# Patient Record
Sex: FEMALE | Race: WHITE | URBAN - METROPOLITAN AREA
[De-identification: names, ages, dates, MRNs, and addresses within clinical notes are randomized per-mention and may not be internally consistent; named-entity substitution may affect disease eponyms.]

---

## 2017-08-02 ENCOUNTER — APPOINTMENT (OUTPATIENT)
Dept: MRI IMAGING | Facility: CLINIC | Age: 77
End: 2017-08-02
Attending: EMERGENCY MEDICINE
Payer: MEDICARE

## 2017-08-02 ENCOUNTER — HOSPITAL ENCOUNTER (OUTPATIENT)
Facility: CLINIC | Age: 77
Setting detail: OBSERVATION
Discharge: HOME | End: 2017-08-03
Attending: EMERGENCY MEDICINE | Admitting: HOSPITALIST
Payer: MEDICARE

## 2017-08-02 ENCOUNTER — APPOINTMENT (OUTPATIENT)
Dept: PHYSICAL THERAPY | Facility: CLINIC | Age: 77
End: 2017-08-02
Attending: PHYSICIAN ASSISTANT
Payer: MEDICARE

## 2017-08-02 DIAGNOSIS — R42 VERTIGO: ICD-10-CM

## 2017-08-02 DIAGNOSIS — H81.12 BPPV (BENIGN PAROXYSMAL POSITIONAL VERTIGO), LEFT: Primary | ICD-10-CM

## 2017-08-02 PROBLEM — H81.10 BPPV (BENIGN PAROXYSMAL POSITIONAL VERTIGO): Status: ACTIVE | Noted: 2017-08-02

## 2017-08-02 LAB
ALBUMIN UR-MCNC: NEGATIVE MG/DL
ANION GAP SERPL CALCULATED.3IONS-SCNC: 8 MMOL/L (ref 3–14)
APPEARANCE UR: CLEAR
BACTERIA #/AREA URNS HPF: ABNORMAL /HPF
BASOPHILS # BLD AUTO: 0 10E9/L (ref 0–0.2)
BASOPHILS NFR BLD AUTO: 0.5 %
BILIRUB UR QL STRIP: NEGATIVE
BUN SERPL-MCNC: 19 MG/DL (ref 7–30)
CALCIUM SERPL-MCNC: 8.6 MG/DL (ref 8.5–10.1)
CHLORIDE SERPL-SCNC: 104 MMOL/L (ref 94–109)
CO2 SERPL-SCNC: 26 MMOL/L (ref 20–32)
COLOR UR AUTO: ABNORMAL
CREAT SERPL-MCNC: 0.9 MG/DL (ref 0.52–1.04)
DIFFERENTIAL METHOD BLD: NORMAL
EOSINOPHIL # BLD AUTO: 0.3 10E9/L (ref 0–0.7)
EOSINOPHIL NFR BLD AUTO: 3.4 %
ERYTHROCYTE [DISTWIDTH] IN BLOOD BY AUTOMATED COUNT: 12.8 % (ref 10–15)
GFR SERPL CREATININE-BSD FRML MDRD: 61 ML/MIN/1.7M2
GLUCOSE BLDC GLUCOMTR-MCNC: 266 MG/DL (ref 70–99)
GLUCOSE BLDC GLUCOMTR-MCNC: 323 MG/DL (ref 70–99)
GLUCOSE SERPL-MCNC: 384 MG/DL (ref 70–99)
GLUCOSE UR STRIP-MCNC: >499 MG/DL
HCT VFR BLD AUTO: 35.6 % (ref 35–47)
HGB BLD-MCNC: 11.7 G/DL (ref 11.7–15.7)
HGB UR QL STRIP: NEGATIVE
IMM GRANULOCYTES # BLD: 0 10E9/L (ref 0–0.4)
IMM GRANULOCYTES NFR BLD: 0.5 %
INTERPRETATION ECG - MUSE: NORMAL
KETONES UR STRIP-MCNC: 20 MG/DL
LEUKOCYTE ESTERASE UR QL STRIP: ABNORMAL
LYMPHOCYTES # BLD AUTO: 2.1 10E9/L (ref 0.8–5.3)
LYMPHOCYTES NFR BLD AUTO: 23.8 %
MCH RBC QN AUTO: 29.8 PG (ref 26.5–33)
MCHC RBC AUTO-ENTMCNC: 32.9 G/DL (ref 31.5–36.5)
MCV RBC AUTO: 91 FL (ref 78–100)
MONOCYTES # BLD AUTO: 0.6 10E9/L (ref 0–1.3)
MONOCYTES NFR BLD AUTO: 6.3 %
MUCOUS THREADS #/AREA URNS LPF: PRESENT /LPF
NEUTROPHILS # BLD AUTO: 5.7 10E9/L (ref 1.6–8.3)
NEUTROPHILS NFR BLD AUTO: 65.5 %
NITRATE UR QL: NEGATIVE
NRBC # BLD AUTO: 0 10*3/UL
NRBC BLD AUTO-RTO: 0 /100
PH UR STRIP: 5 PH (ref 5–7)
PLATELET # BLD AUTO: 178 10E9/L (ref 150–450)
POTASSIUM SERPL-SCNC: 4.2 MMOL/L (ref 3.4–5.3)
RBC # BLD AUTO: 3.93 10E12/L (ref 3.8–5.2)
RBC #/AREA URNS AUTO: 1 /HPF (ref 0–2)
SODIUM SERPL-SCNC: 138 MMOL/L (ref 133–144)
SP GR UR STRIP: 1.02 (ref 1–1.03)
SQUAMOUS #/AREA URNS AUTO: <1 /HPF (ref 0–1)
TROPONIN I SERPL-MCNC: 0.02 UG/L (ref 0–0.04)
URN SPEC COLLECT METH UR: ABNORMAL
UROBILINOGEN UR STRIP-MCNC: 0 MG/DL (ref 0–2)
WBC # BLD AUTO: 8.7 10E9/L (ref 4–11)
WBC #/AREA URNS AUTO: 8 /HPF (ref 0–2)

## 2017-08-02 PROCEDURE — 81001 URINALYSIS AUTO W/SCOPE: CPT | Performed by: EMERGENCY MEDICINE

## 2017-08-02 PROCEDURE — 97112 NEUROMUSCULAR REEDUCATION: CPT | Mod: GP | Performed by: PHYSICAL THERAPIST

## 2017-08-02 PROCEDURE — 85025 COMPLETE CBC W/AUTO DIFF WBC: CPT | Performed by: EMERGENCY MEDICINE

## 2017-08-02 PROCEDURE — 93005 ELECTROCARDIOGRAM TRACING: CPT

## 2017-08-02 PROCEDURE — 70553 MRI BRAIN STEM W/O & W/DYE: CPT

## 2017-08-02 PROCEDURE — 25000128 H RX IP 250 OP 636: Performed by: PHYSICIAN ASSISTANT

## 2017-08-02 PROCEDURE — 97161 PT EVAL LOW COMPLEX 20 MIN: CPT | Mod: GP | Performed by: PHYSICAL THERAPIST

## 2017-08-02 PROCEDURE — 99285 EMERGENCY DEPT VISIT HI MDM: CPT | Mod: 25

## 2017-08-02 PROCEDURE — 84484 ASSAY OF TROPONIN QUANT: CPT | Performed by: EMERGENCY MEDICINE

## 2017-08-02 PROCEDURE — 00000146 ZZHCL STATISTIC GLUCOSE BY METER IP

## 2017-08-02 PROCEDURE — 70549 MR ANGIOGRAPH NECK W/O&W/DYE: CPT

## 2017-08-02 PROCEDURE — 80048 BASIC METABOLIC PNL TOTAL CA: CPT | Performed by: EMERGENCY MEDICINE

## 2017-08-02 PROCEDURE — 25000128 H RX IP 250 OP 636: Performed by: EMERGENCY MEDICINE

## 2017-08-02 PROCEDURE — 25000131 ZZH RX MED GY IP 250 OP 636 PS 637: Performed by: EMERGENCY MEDICINE

## 2017-08-02 PROCEDURE — 70544 MR ANGIOGRAPHY HEAD W/O DYE: CPT | Mod: XS

## 2017-08-02 PROCEDURE — A9585 GADOBUTROL INJECTION: HCPCS | Performed by: RADIOLOGY

## 2017-08-02 PROCEDURE — 40000193 ZZH STATISTIC PT WARD VISIT: Performed by: PHYSICAL THERAPIST

## 2017-08-02 PROCEDURE — 99219 ZZC INITIAL OBSERVATION CARE,LEVL II: CPT | Performed by: PHYSICIAN ASSISTANT

## 2017-08-02 PROCEDURE — 96372 THER/PROPH/DIAG INJ SC/IM: CPT

## 2017-08-02 PROCEDURE — 25000128 H RX IP 250 OP 636: Performed by: RADIOLOGY

## 2017-08-02 PROCEDURE — 25000131 ZZH RX MED GY IP 250 OP 636 PS 637: Performed by: PHYSICIAN ASSISTANT

## 2017-08-02 PROCEDURE — G0378 HOSPITAL OBSERVATION PER HR: HCPCS

## 2017-08-02 PROCEDURE — 96361 HYDRATE IV INFUSION ADD-ON: CPT

## 2017-08-02 PROCEDURE — 96360 HYDRATION IV INFUSION INIT: CPT | Mod: 59

## 2017-08-02 PROCEDURE — 25000132 ZZH RX MED GY IP 250 OP 250 PS 637: Performed by: PHYSICIAN ASSISTANT

## 2017-08-02 RX ORDER — MECLIZINE HYDROCHLORIDE 25 MG/1
25 TABLET ORAL ONCE
Status: COMPLETED | OUTPATIENT
Start: 2017-08-02 | End: 2017-08-02

## 2017-08-02 RX ORDER — LOSARTAN POTASSIUM 25 MG/1
100 TABLET ORAL EVERY EVENING
Status: DISCONTINUED | OUTPATIENT
Start: 2017-08-02 | End: 2017-08-03 | Stop reason: HOSPADM

## 2017-08-02 RX ORDER — MECLIZINE HCL 12.5 MG 12.5 MG/1
12.5-25 TABLET ORAL 4 TIMES DAILY PRN
Qty: 20 TABLET | Refills: 0 | Status: SHIPPED | OUTPATIENT
Start: 2017-08-02 | End: 2017-08-03

## 2017-08-02 RX ORDER — GLIPIZIDE 10 MG/1
20 TABLET ORAL
Status: DISCONTINUED | OUTPATIENT
Start: 2017-08-03 | End: 2017-08-03 | Stop reason: HOSPADM

## 2017-08-02 RX ORDER — LIDOCAINE 40 MG/G
CREAM TOPICAL
Status: DISCONTINUED | OUTPATIENT
Start: 2017-08-02 | End: 2017-08-03 | Stop reason: HOSPADM

## 2017-08-02 RX ORDER — IBUPROFEN 600 MG/1
600 TABLET, FILM COATED ORAL EVERY 6 HOURS PRN
Status: DISCONTINUED | OUTPATIENT
Start: 2017-08-02 | End: 2017-08-03 | Stop reason: HOSPADM

## 2017-08-02 RX ORDER — ONDANSETRON 2 MG/ML
4 INJECTION INTRAMUSCULAR; INTRAVENOUS EVERY 6 HOURS PRN
Status: DISCONTINUED | OUTPATIENT
Start: 2017-08-02 | End: 2017-08-03 | Stop reason: HOSPADM

## 2017-08-02 RX ORDER — NICOTINE POLACRILEX 4 MG
15-30 LOZENGE BUCCAL
Status: DISCONTINUED | OUTPATIENT
Start: 2017-08-02 | End: 2017-08-03 | Stop reason: HOSPADM

## 2017-08-02 RX ORDER — ACETAMINOPHEN 325 MG/1
650 TABLET ORAL EVERY 4 HOURS PRN
Status: DISCONTINUED | OUTPATIENT
Start: 2017-08-02 | End: 2017-08-03 | Stop reason: HOSPADM

## 2017-08-02 RX ORDER — POLYETHYLENE GLYCOL 3350 17 G/17G
17 POWDER, FOR SOLUTION ORAL DAILY PRN
Status: DISCONTINUED | OUTPATIENT
Start: 2017-08-02 | End: 2017-08-03 | Stop reason: HOSPADM

## 2017-08-02 RX ORDER — MECLIZINE HYDROCHLORIDE 25 MG/1
25 TABLET ORAL 3 TIMES DAILY
Status: DISCONTINUED | OUTPATIENT
Start: 2017-08-02 | End: 2017-08-02

## 2017-08-02 RX ORDER — MECLIZINE HYDROCHLORIDE 25 MG/1
25 TABLET ORAL EVERY 6 HOURS SCHEDULED
Status: DISCONTINUED | OUTPATIENT
Start: 2017-08-02 | End: 2017-08-03 | Stop reason: HOSPADM

## 2017-08-02 RX ORDER — DEXTROSE MONOHYDRATE 25 G/50ML
25-50 INJECTION, SOLUTION INTRAVENOUS
Status: DISCONTINUED | OUTPATIENT
Start: 2017-08-02 | End: 2017-08-03 | Stop reason: HOSPADM

## 2017-08-02 RX ORDER — LEVOTHYROXINE SODIUM 112 UG/1
112 TABLET ORAL DAILY
Status: DISCONTINUED | OUTPATIENT
Start: 2017-08-03 | End: 2017-08-03 | Stop reason: HOSPADM

## 2017-08-02 RX ORDER — NALOXONE HYDROCHLORIDE 0.4 MG/ML
.1-.4 INJECTION, SOLUTION INTRAMUSCULAR; INTRAVENOUS; SUBCUTANEOUS
Status: DISCONTINUED | OUTPATIENT
Start: 2017-08-02 | End: 2017-08-03 | Stop reason: HOSPADM

## 2017-08-02 RX ORDER — LIDOCAINE 40 MG/G
CREAM TOPICAL
Status: DISCONTINUED | OUTPATIENT
Start: 2017-08-02 | End: 2017-08-02

## 2017-08-02 RX ORDER — HYDROCHLOROTHIAZIDE 12.5 MG/1
12.5 CAPSULE ORAL DAILY
Status: DISCONTINUED | OUTPATIENT
Start: 2017-08-02 | End: 2017-08-03 | Stop reason: HOSPADM

## 2017-08-02 RX ORDER — AMOXICILLIN 250 MG
1-2 CAPSULE ORAL 2 TIMES DAILY PRN
Status: DISCONTINUED | OUTPATIENT
Start: 2017-08-02 | End: 2017-08-03 | Stop reason: HOSPADM

## 2017-08-02 RX ORDER — ASPIRIN 81 MG/1
81 TABLET, CHEWABLE ORAL DAILY
Status: DISCONTINUED | OUTPATIENT
Start: 2017-08-02 | End: 2017-08-03 | Stop reason: HOSPADM

## 2017-08-02 RX ORDER — GADOBUTROL 604.72 MG/ML
10 INJECTION INTRAVENOUS ONCE
Status: COMPLETED | OUTPATIENT
Start: 2017-08-02 | End: 2017-08-02

## 2017-08-02 RX ORDER — LORAZEPAM 2 MG/ML
.25-.5 INJECTION INTRAMUSCULAR EVERY 4 HOURS PRN
Status: DISCONTINUED | OUTPATIENT
Start: 2017-08-02 | End: 2017-08-03 | Stop reason: HOSPADM

## 2017-08-02 RX ORDER — METOPROLOL TARTRATE 50 MG
50 TABLET ORAL 2 TIMES DAILY
Status: DISCONTINUED | OUTPATIENT
Start: 2017-08-02 | End: 2017-08-03 | Stop reason: HOSPADM

## 2017-08-02 RX ORDER — GLIPIZIDE 10 MG/1
10 TABLET ORAL
Status: DISCONTINUED | OUTPATIENT
Start: 2017-08-02 | End: 2017-08-03 | Stop reason: HOSPADM

## 2017-08-02 RX ORDER — SODIUM CHLORIDE 9 MG/ML
INJECTION, SOLUTION INTRAVENOUS CONTINUOUS
Status: DISCONTINUED | OUTPATIENT
Start: 2017-08-02 | End: 2017-08-03 | Stop reason: HOSPADM

## 2017-08-02 RX ORDER — ONDANSETRON 4 MG/1
4 TABLET, ORALLY DISINTEGRATING ORAL EVERY 6 HOURS PRN
Status: DISCONTINUED | OUTPATIENT
Start: 2017-08-02 | End: 2017-08-03 | Stop reason: HOSPADM

## 2017-08-02 RX ORDER — OXYBUTYNIN CHLORIDE 5 MG/1
10 TABLET ORAL AT BEDTIME
Status: DISCONTINUED | OUTPATIENT
Start: 2017-08-02 | End: 2017-08-03 | Stop reason: HOSPADM

## 2017-08-02 RX ADMIN — GLIPIZIDE 10 MG: 10 TABLET ORAL at 19:10

## 2017-08-02 RX ADMIN — INSULIN GLARGINE 40 UNITS: 100 INJECTION, SOLUTION SUBCUTANEOUS at 12:26

## 2017-08-02 RX ADMIN — MECLIZINE HYDROCHLORIDE 25 MG: 25 TABLET ORAL at 19:10

## 2017-08-02 RX ADMIN — GADOBUTROL 10 ML: 604.72 INJECTION INTRAVENOUS at 07:12

## 2017-08-02 RX ADMIN — METFORMIN HYDROCHLORIDE 1000 MG: 500 TABLET ORAL at 12:26

## 2017-08-02 RX ADMIN — LOSARTAN POTASSIUM 100 MG: 25 TABLET, FILM COATED ORAL at 19:10

## 2017-08-02 RX ADMIN — SODIUM CHLORIDE: 9 INJECTION, SOLUTION INTRAVENOUS at 23:51

## 2017-08-02 RX ADMIN — MECLIZINE HYDROCHLORIDE 25 MG: 25 TABLET ORAL at 13:27

## 2017-08-02 RX ADMIN — SODIUM CHLORIDE: 9 INJECTION, SOLUTION INTRAVENOUS at 11:42

## 2017-08-02 RX ADMIN — OXYBUTYNIN CHLORIDE 10 MG: 5 TABLET ORAL at 23:52

## 2017-08-02 RX ADMIN — HYDROCHLOROTHIAZIDE 12.5 MG: 12.5 CAPSULE ORAL at 12:26

## 2017-08-02 RX ADMIN — MECLIZINE HYDROCHLORIDE 25 MG: 25 TABLET ORAL at 05:13

## 2017-08-02 RX ADMIN — METOPROLOL TARTRATE 50 MG: 50 TABLET, FILM COATED ORAL at 19:10

## 2017-08-02 RX ADMIN — METFORMIN HYDROCHLORIDE 1000 MG: 500 TABLET ORAL at 19:10

## 2017-08-02 RX ADMIN — ASPIRIN 81 MG 81 MG: 81 TABLET ORAL at 11:43

## 2017-08-02 RX ADMIN — METOPROLOL TARTRATE 50 MG: 50 TABLET, FILM COATED ORAL at 11:43

## 2017-08-02 RX ADMIN — SODIUM CHLORIDE 1000 ML: 9 INJECTION, SOLUTION INTRAVENOUS at 05:11

## 2017-08-02 RX ADMIN — MECLIZINE HYDROCHLORIDE 25 MG: 25 TABLET ORAL at 23:52

## 2017-08-02 ASSESSMENT — ENCOUNTER SYMPTOMS
HEADACHES: 0
DIZZINESS: 1
NAUSEA: 1
NUMBNESS: 0
WEAKNESS: 0
VOMITING: 0

## 2017-08-02 NOTE — PLAN OF CARE
Problem: Discharge Planning  Goal: Discharge Planning (Adult, OB, Behavioral, Peds)  Outcome: Improving  PRIMARY DIAGNOSIS: VERTIGO     OUTPATIENT/OBSERVATION GOALS TO BE MET BEFORE DISCHARGE  1. Orthostatic performed: Yes:          Lying Orthostatic BP: 167/62         Sitting Orthostatic BP: 178/62         Standing Orthostatic BP: 151/70      2. Completion of appropriate imaging: Yes     3. Tolerating PO medications: Yes     4. Return to near baseline physical activity: Yes     5. Cleared for discharge by consultants (if involved): No     Discharge Planner Nurse   Safe discharge environment identified: Yes  Barriers to discharge: No       Entered by: Mariangel Zendejas 08/02/2017 12:31 PM     Please review provider order for any additional goals.   Nurse to notify provider when observation goals have been met and patient is ready for discharge.     Patient is alert and oriented. Patient denies pain. Patient says she only feels dizzy when standing up, dizziness has improved since arrival. Patient has ordered lunch now. Patient is a SBA for safety. PT consult in d/t vertigo symptoms. Patient has IV fluids running, orthostatics were negative prior to IV fluid hook up. Patient is here visiting from [place], and patient says she was supposed to fly out today, but did not because of dizziness. Patient's  is in [place]. VSS. Will continue to monitor.

## 2017-08-02 NOTE — IP AVS SNAPSHOT
MRN:[id number]                      After Visit Summary   8/2/2017    Terri Leavitt    MRN: [id number]           Thank you!     Thank you for choosing [facility] for your care. Our goal is always to provide you with excellent care. Hearing back from our patients is one way we can continue to improve our services. Please take a few minutes to complete the written survey that you may receive in the mail after you visit. If you would like to speak to someone directly about your visit please contact Patient Relations at [phone]. Thank you!          Patient Information     Date Of Birth          1940        About your hospital stay     You were admitted on:  August 2, 2017 You last received care in the:  [facility] Observation Department    You were discharged on:  August 3, 2017        Reason for your hospital stay       You were admitted due to concerns for dizziness related to benign paroxysmal positional vertigo. You were treated supportively overnight and evaluated by physical therapy who performed canalith repositioning. Your symptoms improved and you were safe to discharge home with outpatient follow up.                  Who to Call     For medical emergencies, please call 911.  For non-urgent questions about your medical care, please call your primary care provider or clinic, None          Attending Provider     Provider Specialty    Brad Flores MD Emergency Medicine    [facility], Brad Sheikh MD Emergency Medicine    Fredis Kenny MD Internal Medicine       Primary Care Provider    None      After Care Instructions     Activity       Your activity upon discharge: activity as tolerated            Diet       Follow this diet upon discharge: Orders Placed This Encounter      Regular Diet Adult                  Follow-up Appointments     Follow-up and recommended labs and tests        Follow up with primary care provider, within 7 days for hospital follow-  up. No follow up labs or test are needed. Discuss with primary care about outpatient physical therapy for vestibular rehab as needed.                   Follow-up Information     Follow up with PCP as needed.                Further instructions from your care team         Discharge Instructions  Vertigo  You have been diagnosed with vertigo.  This is a feeling that you are spinning or that the room is moving around you. You will often have nausea, vomiting, and balance problems with it.  Vertigo is usually caused by a problem in the inner ear which helps control your balance.  Many things can cause vertigo, including calcium collections in the inner ear, a virus infection of the inner ear, concussion, migraine, and some medicines.  Luckily, these causes are not life threatening and will eventually go away.  However, sometimes there is a serious problem that does not show up right away.  Return to the Emergency Department if you have:    New or severe headache.    Temperature greater than 100.4 F (38 C).    Seeing double or having trouble seeing clearly.    Trouble speaking or hearing.    Weakness in an arm or leg.    Passing out.    Numbness or tingling.    Chest pain.    Vomiting that will not stop.    Treatment:    An antihistamine, such as Antivert  (meclizine), or non-prescription medicines like Dramamine  (dimenhydrinate), or Benadryl  (diphenhydramine).    Prescription anti-nausea medicines, such as Phenergan  (promethazine), Reglan  (metoclopramide), or Zofran  (ondansetron).    Prescription sedative medicines, such as Valium  (diazepam), Ativan  (lorazepam), or Klonopin  (clonazepam).    Most of these medicines make you sleepy, and you should not take them before you work or drive. You should only take prescription medicines to treat severe vertigo symptoms, and you should stop the medicine when your symptoms improve.    Follow Up:    If you have vertigo longer than three days, it is important that you follow  "up either with your primary doctor or an Ear Nose and Throat doctor.  You may need further testing to evaluate your vertigo and you may also need  vestibular  therapy which is a special form of physical therapy to make the vertigo go away.    If you were given a prescription for medicine here today, be sure to read all of the information (including the package insert) that comes with your prescription.  This will include important information about the medicine, its side effects, and any warnings that you need to know about.  The pharmacist who fills the prescription can provide more information and answer questions you may have about the medicine.  If you have questions or concerns that the pharmacist cannot address, please call or return to the Emergency Department.   Remember that you can always come back to the Emergency Department if you are not able to see your regular doctor in the amount of time listed above, if you get any new symptoms, or if there is anything that worries you.          Pending Results     No orders found for last 3 day(s).            Statement of Approval     Ordered          08/03/17 1116  I have reviewed and agree with all the recommendations and orders detailed in this document.  EFFECTIVE NOW     Approved and electronically signed by:  Yee Desir PA-C             Admission Information     Date & Time Provider Department Dept. Phone    8/2/2017 Fredis Kenny MD [facility] Observation Department [phone]      Your Vitals Were     Blood Pressure Pulse Temperature Respirations Height Weight    147/51 (BP Location: Right arm) 61 96.5  F (35.8  C) (Oral) 16 1.651 m (5' 5\") 72.4 kg (159 lb 11.2 oz)    Pulse Oximetry BMI (Body Mass Index)                98% 26.58 kg/m2          MyChart Information     [employer] lets you send messages to your doctor, view your test results, renew your prescriptions, schedule appointments and more. To sign up, go to " "[email or web address] . Click on \"Log in\" on the left side of the screen, which will take you to the Welcome page. Then click on \"Sign up Now\" on the right side of the page.     You will be asked to enter the access code listed below, as well as some personal information. Please follow the directions to create your username and password.     Your access code is: [email or web address]  Expires: 2017 12:47 PM     Your access code will  in 90 days. If you need help or a new code, please call your [facility] clinic or [phone].        Care EveryWhere ID     This is your Care EveryWhere ID. This could be used by other organizations to access your [facility] medical records  [id number]        Equal Access to Services     REID TAPIA : Tanja Carter, dariela oconnor, maddi morgan, ham yanez . So [facility] [phone].    ATENCIÓN: Si habla español, tiene a kuhn disposición servicios gratuitos de asistencia lingüística. Amelia al [phone].    We comply with applicable federal civil rights laws and [place] laws. We do not discriminate on the basis of race, color, national origin, age, disability sex, sexual orientation or gender identity.               Review of your medicines      START taking        Dose / Directions    meclizine 25 MG tablet   Commonly known as:  ANTIVERT        Dose:  25 mg   Take 1 tablet (25 mg) by mouth 3 times daily as needed for dizziness   Quantity:  30 tablet   Refills:  0       order for DME        Equipment being ordered: Cane () Treatment Diagnosis: Impaired gait stability   Quantity:  1 each   Refills:  0         CONTINUE these medicines which have NOT CHANGED        Dose / Directions    ASPIRIN PO        Dose:  81 mg   Take 81 mg by mouth daily   Refills:  0       FOLIC ACID PO        Dose:  1 mg   Take 1 mg by mouth daily   Refills:  0       * GLIPIZIDE PO        Dose:  20 mg   Take 20 mg by mouth every morning (before " breakfast)   Refills:  0       * GLIPIZIDE PO        Dose:  10 mg   Take 10 mg by mouth every evening   Refills:  0       HYDROCHLOROTHIAZIDE PO        Dose:  12.5 mg   Take 12.5 mg by mouth daily   Refills:  0       insulin glargine 100 UNIT/ML injection   Commonly known as:  LANTUS        Dose:  40 Units   Inject 40 Units Subcutaneous every morning   Refills:  0       LOSARTAN POTASSIUM PO        Dose:  100 mg   Take 100 mg by mouth every evening   Refills:  0       METFORMIN HCL PO        Dose:  1000 mg   Take 1,000 mg by mouth 2 times daily (with meals)   Refills:  0       METOPROLOL TARTRATE PO        Dose:  50 mg   Take 50 mg by mouth 2 times daily   Refills:  0       OXYBUTYNIN CHLORIDE PO        Dose:  10 mg   Take 10 mg by mouth At Bedtime   Refills:  0       SIMVASTATIN PO        Dose:  20 mg   Take 20 mg by mouth At Bedtime   Refills:  0       SYNTHROID PO        Dose:  112 mcg   Take 112 mcg by mouth daily   Refills:  0       * Notice:  This list has 2 medication(s) that are the same as other medications prescribed for you. Read the directions carefully, and ask your doctor or other care provider to review them with you.         Where to get your medicines      These medications were sent to [facility] [id number] [facility] [address]     Phone:  [phone]     meclizine 25 MG tablet         Some of these will need a paper prescription and others can be bought over the counter. Ask your nurse if you have questions.     Bring a paper prescription for each of these medications     order for DME                Protect others around you: Learn how to safely use, store and throw away your medicines at www.disposemymeds.org.             Medication List: This is a list of all your medications and when to take them. Check marks below indicate your daily home schedule. Keep this list as a reference.      Medications           Morning  Afternoon Evening Bedtime As Needed    ASPIRIN PO   Take 81 mg by mouth daily   Last time this was given:  81 mg on 8/3/2017  8:11 AM                                FOLIC ACID PO   Take 1 mg by mouth daily                                * GLIPIZIDE PO   Take 20 mg by mouth every morning (before breakfast)   Last time this was given:  20 mg on 8/3/2017  8:11 AM                                * GLIPIZIDE PO   Take 10 mg by mouth every evening   Last time this was given:  20 mg on 8/3/2017  8:11 AM                                HYDROCHLOROTHIAZIDE PO   Take 12.5 mg by mouth daily   Last time this was given:  12.5 mg on 8/3/2017  8:11 AM                                insulin glargine 100 UNIT/ML injection   Commonly known as:  LANTUS   Inject 40 Units Subcutaneous every morning                                LOSARTAN POTASSIUM PO   Take 100 mg by mouth every evening   Last time this was given:  100 mg on 8/2/2017  7:10 PM                                meclizine 25 MG tablet   Commonly known as:  ANTIVERT   Take 1 tablet (25 mg) by mouth 3 times daily as needed for dizziness   Last time this was given:  25 mg on 8/3/2017 11:37 AM                                METFORMIN HCL PO   Take 1,000 mg by mouth 2 times daily (with meals)   Last time this was given:  1,000 mg on 8/3/2017  8:10 AM                                METOPROLOL TARTRATE PO   Take 50 mg by mouth 2 times daily   Last time this was given:  50 mg on 8/3/2017  8:11 AM                                order for DME   Equipment being ordered: Cane () Treatment Diagnosis: Impaired gait stability                                OXYBUTYNIN CHLORIDE PO   Take 10 mg by mouth At Bedtime   Last time this was given:  10 mg on 8/2/2017 11:52 PM                                SIMVASTATIN PO   Take 20 mg by mouth At Bedtime                                SYNTHROID PO   Take 112 mcg by mouth daily   Last time this was given:  112 mcg on 8/3/2017  8:11 AM                                 * Notice:  This list has 2 medication(s) that are the same as other medications prescribed for you. Read the directions carefully, and ask your doctor or other care provider to review them with you.

## 2017-08-02 NOTE — DISCHARGE INSTRUCTIONS
Discharge Instructions  Vertigo  You have been diagnosed with vertigo.  This is a feeling that you are spinning or that the room is moving around you. You will often have nausea, vomiting, and balance problems with it.  Vertigo is usually caused by a problem in the inner ear which helps control your balance.  Many things can cause vertigo, including calcium collections in the inner ear, a virus infection of the inner ear, concussion, migraine, and some medicines.  Luckily, these causes are not life threatening and will eventually go away.  However, sometimes there is a serious problem that does not show up right away.  Return to the Emergency Department if you have:    New or severe headache.    Temperature greater than 100.4 F (38 C).    Seeing double or having trouble seeing clearly.    Trouble speaking or hearing.    Weakness in an arm or leg.    Passing out.    Numbness or tingling.    Chest pain.    Vomiting that will not stop.    Treatment:    An antihistamine, such as Antivert  (meclizine), or non-prescription medicines like Dramamine  (dimenhydrinate), or Benadryl  (diphenhydramine).    Prescription anti-nausea medicines, such as Phenergan  (promethazine), Reglan  (metoclopramide), or Zofran  (ondansetron).    Prescription sedative medicines, such as Valium  (diazepam), Ativan  (lorazepam), or Klonopin  (clonazepam).    Most of these medicines make you sleepy, and you should not take them before you work or drive. You should only take prescription medicines to treat severe vertigo symptoms, and you should stop the medicine when your symptoms improve.    Follow Up:    If you have vertigo longer than three days, it is important that you follow up either with your primary doctor or an Ear Nose and Throat doctor.  You may need further testing to evaluate your vertigo and you may also need  vestibular  therapy which is a special form of physical therapy to make the vertigo go away.    If you were given a  prescription for medicine here today, be sure to read all of the information (including the package insert) that comes with your prescription.  This will include important information about the medicine, its side effects, and any warnings that you need to know about.  The pharmacist who fills the prescription can provide more information and answer questions you may have about the medicine.  If you have questions or concerns that the pharmacist cannot address, please call or return to the Emergency Department.   Remember that you can always come back to the Emergency Department if you are not able to see your regular doctor in the amount of time listed above, if you get any new symptoms, or if there is anything that worries you.

## 2017-08-02 NOTE — IP AVS SNAPSHOT
[facility] Observation Department    [address]    [facility] [address]    Phone:  [phone]                                       After Visit Summary   8/2/2017    Terri Leavitt    MRN: [id number]           After Visit Summary Signature Page     I have received my discharge instructions, and my questions have been answered. I have discussed any challenges I see with this plan with the nurse or doctor.    ..........................................................................................................................................  Patient/Patient Representative Signature      ..........................................................................................................................................  Patient Representative Print Name and Relationship to Patient    ..................................................               ................................................  Date                                            Time    ..........................................................................................................................................  Reviewed by Signature/Title    ...................................................              ..............................................  Date                                                            Time

## 2017-08-02 NOTE — ED NOTES
[facility]  ED Nurse Handoff Report    Terri Leavitt is a 77 year old female   ED Chief complaint: Dizziness and Nausea & Vomiting  . ED Diagnosis:   Final diagnoses:   Vertigo     Allergies:   Allergies   Allergen Reactions     Altace [Ramipril] Other (See Comments)     cough     Sulfa Drugs Rash       Code Status: Full Code  Activity level - Baseline/Home:  Independent. Activity Level - Current:   Stand with Assist. Lift room needed: No. Bariatric: No   Needed: No   Isolation: No. Infection: Not Applicable.     Vital Signs:   Vitals:    08/02/17 0730 08/02/17 0745 08/02/17 0800 08/02/17 0815   BP:   176/73 151/67   Resp:       Temp:       TempSrc:       SpO2: 95% 96% 94% 96%       Cardiac Rhythm:  ,      Pain level:    Patient confused: No. Patient Falls Risk: Yes.   Elimination Status: Has voided   Patient Report - Initial Complaint: dizziness. Focused Assessment:    05:05 Cardiac Review of Systems (Cardiac) - Cardiac Signs/Symptoms: denies; chest pain ER    05:05 Skin Color/Condition Skin - Skin WDL: WDL ER    05:04 Orders Completed EKG 12 lead ER    05:04 Card test complete Completed EKG 12 lead ER    05:04 Neurological Cognitive/Perceptual/Neuro - Level Of Consciousness: alert Arousal Level: opens eyes spontaneously Orientation: oriented x 4 Best Language: 0 - No aphasia Mood/Behavior: cooperative; calm Headache: None Neurological Comment: dizziness Speech: logical; spontaneous; clear  Pupils (CN II) - Pupil PERRLA: yes         Tests Performed: labs/MRI. Abnormal Results:   Labs Ordered and Resulted from Time of ED Arrival Up to the Time of Departure from the ED   BASIC METABOLIC PANEL - Abnormal; Notable for the following:        Result Value    Glucose 384 (*)     All other components within normal limits   ROUTINE UA WITH MICROSCOPIC - Abnormal; Notable for the following:     Glucose Urine >499 (*)     Ketones Urine 20 (*)     Leukocyte Esterase Urine Trace (*)     WBC Urine 8 (*)      Bacteria Urine Few (*)     Mucous Urine Present (*)     All other components within normal limits   CBC WITH PLATELETS DIFFERENTIAL   TROPONIN I   PERIPHERAL IV CATHETER     .   Treatments provided: PO antivert, fluids  Family Comments: friend present  OBS brochure/video discussed/provided to patient:  Yes  ED Medications:   Medications   lidocaine 1 % 1 mL (not administered)   lidocaine (LMX4) kit (not administered)   sodium chloride (PF) 0.9% PF flush 3 mL (not administered)   sodium chloride (PF) 0.9% PF flush 3 mL (not administered)   0.9% sodium chloride BOLUS (0 mLs Intravenous Hold 8/2/17 0627)   meclizine (ANTIVERT) tablet 25 mg (25 mg Oral Given 8/2/17 0513)   gadobutrol (GADAVIST) injection 10 mL (10 mLs Intravenous Given 8/2/17 0712)   sodium chloride (PF) 0.9% PF flush 60 mL (60 mLs Intravenous Given 8/2/17 0712)     Drips infusing:  No  For the majority of the shift this patient was Green. Interventions performed were n/a.     Severe Sepsis OR Septic Shock Diagnosis Present: No      ED Nurse Name/Phone Number: Venice Rodriguez,   9:29 AM  RECEIVING UNIT ED HANDOFF REVIEW    Above ED Nurse Handoff Report was reviewed: Yes  Reviewed by: Mariangel Zendejas on August 2, 2017 at 10:12 AM

## 2017-08-02 NOTE — PLAN OF CARE
Problem: Discharge Planning  Goal: Discharge Planning (Adult, OB, Behavioral, Peds)  PRIMARY DIAGNOSIS: VERTIGO     OUTPATIENT/OBSERVATION GOALS TO BE MET BEFORE DISCHARGE  1. Orthostatic performed: Yes:          Lying Orthostatic BP: 167/62         Sitting Orthostatic BP: 178/62         Standing Orthostatic BP: 151/70      2. Completion of appropriate imaging: Yes     3. Tolerating PO medications: Yes     4. Return to near baseline physical activity: Yes     5. Cleared for discharge by consultants (if involved): No     Discharge Planner Nurse   Safe discharge environment identified: Yes  Barriers to discharge: No       Entered by: Ronel Eli 08/02/2017 6:05 PM     Please review provider order for any additional goals.   Nurse to notify provider when observation goals have been met and patient is ready for discharge.

## 2017-08-02 NOTE — H&P
[facility] Outpatient / Observation Unit  History and Physical Exam     Terri Leavitt MRN# [id number]   YOB: 1940 Age: 77 year old      Date of Admission:  8/2/2017    Primary care provider: None          Assessment   Terri Leavitt is a 77 year old female with a PMH significant for DM, HTN and hypothyroidism, who presented to the Emergency Room with complaints of acute dizziness and spinning sensation that was consistent with vertigo.   Work up in the ED reveals: VSS. BMP unremarkable other than glucose of 384. CBC unremarkable. UA negative for infection, >499 glucose. MRI of head negative for acute process, MRA of head and neck also negative. She received 1L NS bolus and 25 mg PO meclizine.   Patient will be registered to Observation for further work-up and evaluation.     1. Acute Vertigo - suspect BPPV. Endorses previous brief episodes of what sounds like BPPV, today was similar but more severe and lasting longer. ED work up unremarkable other than hyperglycemia. MRI/MRA negative. Sx improved after meclizine. Continue with supportive cares with IVFs and antiemetics, meclizine every 6 hrs. PT consult  2. DM - on metformin, glipizide and Lantus, resume. Hyperglycemic in ED. Glucose checks BID  3. HTN - resume home meds  4. Hypothyroidism - resume home meds         Plan     1. Registered to Observation  2. Start meclizine q6  3. Cont supportive treatment with IVF, antiemetics and prn ativan  4. PT/OT eval for gait assessment and vestibular rehab                Chief Complaint:   Dizziness          History of Present Illness:   Terri Leavitt is a 77 year old female with a PMH significant for DM, HTN and hypothyroidism, who presented to the Emergency Room with complaints of acute dizziness and spinning sensation that was consistent with vertigo. Pt states she woke up around 0200 with severe dizziness described as room spinning. She had a difficult time getting out of bed and getting to the bathroom but was able to  do so. Unfortunately she was unable to return to bed on her own and called her sister for assistance. She also endorses nausea and vomiting. She describes previous similar episodes where symptoms were worsened by head movements but these episodes were brief and not as severe. She denies recent illness or injury. She denies fever, chills, chest pain, SOB, abd pain, diarrhea or dysuria. Pt is originally from [place] and was planning to travel home today.              Past Medical History:     Past Medical History:   Diagnosis Date     Diabetes (H)      Hypothyroidism    HTN            Past Surgical History:     Past Surgical History:   Procedure Laterality Date     DENTAL SURGERY                 Social History:     Social History     Social History     Marital status:      Spouse name: N/A     Number of children: N/A     Years of education: N/A     Occupational History     Not on file.     Social History Main Topics     Smoking status: Former Smoker     Smokeless tobacco: Never Used     Alcohol use Yes      Comment: occ     Drug use: No     Sexual activity: Not on file     Other Topics Concern     Not on file     Social History Narrative     No narrative on file               Family History:   Reviewed and non contributory         Allergies:      Allergies   Allergen Reactions     Altace [Ramipril] Other (See Comments)     cough     Sulfa Drugs Rash               Medications:     Prior to Admission medications    Medication Sig Last Dose Taking? Auth Provider   GLIPIZIDE PO Take 20 mg by mouth every morning (before breakfast)  8/1/2017 at Unknown time Yes Reported, Patient   METFORMIN HCL PO Take 1,000 mg by mouth 2 times daily (with meals)  8/1/2017 at Unknown time Yes Reported, Patient   SIMVASTATIN PO Take 20 mg by mouth At Bedtime  8/1/2017 at Unknown time Yes Reported, Patient   ASPIRIN PO Take 81 mg by mouth daily  8/1/2017 at Unknown time Yes Reported, Patient   FOLIC ACID PO Take 1 mg by mouth daily  8/1/2017 at Unknown time Yes Reported, Patient   OXYBUTYNIN CHLORIDE PO Take 10 mg by mouth At Bedtime  8/1/2017 at Unknown time Yes Reported, Patient   Levothyroxine Sodium (SYNTHROID PO) Take 112 mcg by mouth daily  8/2/2017 at Unknown time Yes Reported, Patient   meclizine (ANTIVERT) 12.5 MG tablet Take 1-2 tablets (12.5-25 mg) by mouth 4 times daily as needed for dizziness  Yes Brad Flores MD   GLIPIZIDE PO Take 10 mg by mouth every evening 8/1/2017 at Unknown time Yes Unknown, Entered By History   HYDROCHLOROTHIAZIDE PO Take 12.5 mg by mouth daily 8/1/2017 at Unknown time Yes Unknown, Entered By History   insulin glargine (LANTUS) 100 UNIT/ML injection Inject 40 Units Subcutaneous every morning 8/1/2017 at Unknown time Yes Unknown, Entered By History   LOSARTAN POTASSIUM PO Take 100 mg by mouth every evening 8/1/2017 at Unknown time Yes Unknown, Entered By History   METOPROLOL TARTRATE PO Take 50 mg by mouth 2 times daily 8/1/2017 at Unknown time Yes Unknown, Entered By History              Review of Systems:   A Comprehensive greater than 10 system review of systems was carried out.  Pertinent positives and negatives are noted above.  Otherwise negative for contributory information.     C: NEGATIVE for fever, chills, change in weight  E/M: NEGATIVE for ear, mouth and throat problems  R: NEGATIVE for significant cough or SOB  CV: NEGATIVE for chest pain, palpitations or peripheral edema    NEURO ROS: negative except for dizziness             Physical Exam:   Blood pressure 151/67, temperature 97.5  F (36.4  C), temperature source Oral, resp. rate 16, SpO2 96 %.    GENERAL:  Comfortable.  PSYCH: pleasant, oriented, No acute distress.  HEENT:  Atraumatic, normocephalic. Normal conjunctiva, normal hearing, and oropharynx is normal. No nystagmus  NECK:  Supple, no neck vein distention  HEART:  Normal S1, S2 with no murmur, no pericardial rub, gallops or S3 or S4.  LUNGS:  Clear to auscultation, normal Respiratory  effort. No wheezing, rales or ronchi.  GI:  Soft, normal bowel sounds. Non-tender, non distended.   EXTREMITIES:  No pedal edema, +2 pulses bilateral and equal.  SKIN:  Dry to touch, No rash, wound or ulcerations.  NEUROLOGIC:  CN 2-12 intact, BL 5/5 symmetric upper and lower extremity strength, sensation is intact with no focal deficits.             Data:     EKG demonstrates:  sinus bradycardia.      Recent Labs  Lab 08/02/17  0510   WBC 8.7   HGB 11.7   HCT 35.6   MCV 91          Recent Labs  Lab 08/02/17  0510      POTASSIUM 4.2   CHLORIDE 104   CO2 26   ANIONGAP 8   *   BUN 19   CR 0.90   GFRESTIMATED 61   GFRESTBLACK 73   NORMAN 8.6       Recent Labs  Lab 08/02/17  0510   TROPI 0.017       Recent Labs  Lab 08/02/17  0758   COLOR Straw   APPEARANCE Clear   URINEGLC >499*   URINEBILI Negative   URINEKETONE 20*   SG 1.016   UBLD Negative   URINEPH 5.0   PROTEIN Negative   NITRITE Negative   LEUKEST Trace*   RBCU 1   WBCU 8*       Recent Results (from the past 48 hour(s))   MR Brain w/o & w Contrast    Narrative    MRI OF THE BRAIN WITHOUT AND WITH CONTRAST August 2, 2017 7:38 AM     HISTORY: Vertigo.     TECHNIQUE: Axial diffusion-weighted with ADC map, axial T2-weighted  with fat saturation, axial T1-weighted, axial turboFLAIR and coronal  T1-weighted images of the brain were acquired without intravenous  contrast. Following intravenous administration of gadolinium (10 mL  Gadavist), axial T1-weighted images of the brain were acquired.     COMPARISON: None.    FINDINGS: There is mild diffuse cerebral volume loss. There are a few  tiny scattered focal and minimal patchy periventricular areas of  abnormal T2 signal hyperintensity in the cerebral white matter  bilaterally that are consistent with sequela of chronic small vessel  ischemic disease.     The ventricles and basal cisterns are within normal limits in  configuration given the degree of cerebral volume loss. There is no  midline shift.  There are no extra-axial fluid collections. There is no  evidence for stroke or acute intracranial hemorrhage. There is no  abnormal contrast enhancement in the brain or its coverings.     There is no sinusitis or mastoiditis.      Impression    IMPRESSION: Diffuse cerebral volume loss and cerebral white matter  changes consistent with chronic small vessel ischemic disease. No  evidence for acute intracranial pathology.   MR Head w/o Contrast Angiogram    Narrative    MR ANGIOGRAM OF THE HEAD WITHOUT CONTRAST August 2, 2017 7:39 AM     HISTORY: Vertigo.    TECHNIQUE: 3D time-of-flight MR angiogram of the head without  contrast. MIP reconstruction of all MR angiographic data was  performed.    COMPARISON: None.    FINDINGS: The bilateral distal internal carotid, basilar, bilateral  anterior cerebral, bilateral middle cerebral and bilateral posterior  cerebral arteries are patent and unremarkable with no evidence for  cerebral artery stenosis or aneurysm.       Impression    IMPRESSION: Normal MR angiogram of the head.       MR Neck w/o & w Contrast Angiogram    Narrative    MRA NECK WITHOUT AND WITH CONTRAST August 2, 2017 7:39 AM     HISTORY: Evaluate for dissection, vertebrobasilar flow, carotid  stenosis.    TECHNIQUE: 2D time-of-flight MR angiogram of the neck without contrast  and 3D MR angiogram of the neck with 10 mL Gadavist gadolinium IV  contrast. MIP reconstruction of all MR angiographic data was  performed. Estimates of carotid stenoses are made relative to the  distal internal carotid artery diameters except as noted.    COMPARISON: None.    FINDINGS:    Carotids: The common carotid arteries bilaterally are widely patent.  The cervical internal carotid arteries bilaterally are patent without  stenosis.    Vertebrals: The vertebral arteries bilaterally are patent without  stenosis and demonstrate antegrade flow.    Aortic arch: The arteries as they arise from the aortic arch are  normally arranged with no  evidence for stenosis.      Impression    IMPRESSION: Normal MR angiogram of the neck.         Laura JOSE

## 2017-08-02 NOTE — PLAN OF CARE
Problem: Discharge Planning  Goal: Discharge Planning (Adult, OB, Behavioral, Peds)  Outcome: No Change  ROOM # 1133     Living Situation (if not independent, order SW consult):Ind in house in [place] with   Facility name:NA  :      Activity level at baseline: Ind  Activity level on admit: SBA         Patient registered to observation; given Patient Bill of Rights; given the opportunity to ask questions about observation status and their plan of care.  Patient has been oriented to the observation room, bathroom and call light is in place.     Discussed discharge goals and expectations with patient/family.

## 2017-08-02 NOTE — PROGRESS NOTES
08/02/17 1601   Quick Adds   Quick Adds Vestibular Eval   Type of Visit Initial PT Evaluation   Living Environment   Lives With spouse   Living Arrangements house   Home Accessibility stairs to enter home;stairs within home   Number of Stairs Within Home 14   Stair Railings at Home inside, present on right side   Transportation Available family or friend will provide   Living Environment Comment Pt lives in [place] with her spouse, planned to fly home today but delayed flight due to hospitalization with vertigo   Self-Care   Dominant Hand right   Usual Activity Tolerance good   Current Activity Tolerance poor   Regular Exercise no   Equipment Currently Used at Home none   Activity/Exercise/Self-Care Comment Indep PLOF   Functional Level Prior   Ambulation 0-->independent   Transferring 0-->independent   Toileting 0-->independent   Bathing 0-->independent   Dressing 0-->independent   Eating 0-->independent   Communication 0-->understands/communicates without difficulty   Swallowing 0-->swallows foods/liquids without difficulty   Cognition 0 - no cognition issues reported   Fall history within last six months no   Which of the above functional risks had a recent onset or change? ambulation;transferring   Prior Functional Level Comment Indep PLOF, currently impaired gait due to vertigo/off balance.   General Information   Onset of Illness/Injury or Date of Surgery - Date 08/02/17   Referring Physician Laura Mcbride PA-C   Patient/Family Goals Statement pt hopes to have dizziness symptoms resolve and fly home soon to [place]   Pertinent History of Current Problem (include personal factors and/or comorbidities that impact the POC) Pt admitted OBS with acute vertigo that began 1 day ago.  No head injury reported, no recent illness, does have hx of 3-4 episodes of vertigo years ago, resolved on its own.  Pt functions independently at baseline, here visiting from [place].   Precautions/Limitations fall  precautions   Cognitive Status Examination   Orientation orientation to person, place and time   Pain Assessment   Patient Currently in Pain No   Integumentary/Edema   Integumentary/Edema Comments baseline josiah edema in legs   Posture    Posture Forward head position   Range of Motion (ROM)   ROM Comment WNL josiah UEs, LEs, and c-spine   Strength   Strength Comments WNL and symmetrical josiah UE/LE myotomal screen   Bed Mobility   Bed Mobility Comments SBA/min A, limited by vertigo and fear of falling   Transfer Skills   Transfer Comments Sit <> stand transfers with CGA, slow moving due to symptoms and fear of falling, needing UE support or FWW for balance currently   Gait   Gait Comments Slow gait, able to take steps at bedside but very limited due to vertigo and feeling off balance, needing Ax1 and FWW currently.   Balance   Balance Comments Impaired standing balance and gait stability currently due to symptoms, needing Ax1 and FWW support.  Impaired static balance rhomberg EO x 10 sec with moderate sway and LOB.   Sensory Examination   Sensory Perception no deficits were identified   Sensory Perception Comments no new deficits, baseline neuropathy josiah feet   Coordination   Coordination no deficits were identified   Muscle Tone   Muscle Tone no deficits were identified   Oculomotor Exam   Smooth Pursuit Normal   Saccades Normal   VOR Normal   Rapid Head Thrust Normal   Convergence Testing Normal   Infrared Goggle Exam or Frenzel Lense Exam   Exam completed with Room Light   Spontaneous Nystagmus Negative   Gaze Evoked Nystagmus Negative   Head Shake Horizontal Nystagmus Negative   Migue-Hallpike (Right) Comments not tested given (+) L hallpike   Migue-Hallpike (Left) Upbeating L torsional   Migue-Hallpike (Left) Comments latency of 4-5 sec, severe vertigo lasting ~10-15 sec   [facility] Supine Roll Test (Right) Negative   [facility] Supine Roll Test (Left) Negative   General Therapy Interventions   Planned Therapy Interventions balance  "training;gait training;neuromuscular re-education;home program guidelines;risk factor education;progressive activity/exercise;other (see comments)  (CRM and VOR training as indicated)   Clinical Impression   Criteria for Skilled Therapeutic Intervention yes, treatment indicated   PT Diagnosis vertigo   Influenced by the following impairments Vertigo, BPPV, impaired balance   Functional limitations due to impairments Fear of falling, fall risk, impaired safety/tolerance with transfers and gait skills   Clinical Presentation Stable/Uncomplicated   Clinical Presentation Rationale classic BPPV presentation, PLOF, previous episodes of vertigo   Clinical Decision Making (Complexity) Low complexity   Therapy Frequency` daily   Predicted Duration of Therapy Intervention (days/wks) 2 days   Anticipated Equipment Needs at Discharge front wheeled walker  (pending progress)   Anticipated Discharge Disposition Home with Outpatient Therapy  (OP vestib PT)   Risk & Benefits of therapy have been explained Yes   Patient, Family & other staff in agreement with plan of care Yes   Clinical Impression Comments signs and symptoms consistent with BPPV, possible josiah involvement, will benefit from reassessment tomorrow AM.   [facility] [employer]-PAC TM \"6 Clicks\"   2016, Trustees of [facility], under license to [employer].  All rights reserved.   6 Clicks Short Forms Basic Mobility Inpatient Short Form   [facility] AM-PAC  \"6 Clicks\" V.2 Basic Mobility Inpatient Short Form   1. Turning from your back to your side while in a flat bed without using bedrails? 4 - None   2. Moving from lying on your back to sitting on the side of a flat bed without using bedrails? 3 - A Little   3. Moving to and from a bed to a chair (including a wheelchair)? 3 - A Little   4. Standing up from a chair using your arms (e.g., wheelchair, or bedside chair)? 3 - A Little   5. To walk in hospital room? 3 - A Little   6. Climbing 3-5 steps with a " railing? 2 - A Lot   Basic Mobility Raw Score (Score out of 24.Lower scores equate to lower levels of function) 18   Total Evaluation Time   Total Evaluation Time (Minutes) 25

## 2017-08-02 NOTE — PLAN OF CARE
Problem: Goal Outcome Summary  Goal: Goal Outcome Summary     PT:  Eval complete, treatment initiated.  Pt admitted OBS with acute vertigo that began 1 day ago.  No head injury reported, no recent illness, does have hx of 3-4 episodes of vertigo years ago, resolved on its own.  Pt functions independently at baseline, here visiting from [place].     Discharge Planner PT   Patient plan for discharge: hopeful to return home via flight to [place] tomorrow  Current status: (+) positional testing for L posterior canal BPPV, possible R side involvement too.  Treated with canalith repositioning maneuver x 4 on L, improved nystagmus and symptoms noted.  Up to chair, recommend using FWW currently.  Advised to walk later with [facility] staff.  Avoid excessive head movements up/down side/side and keep HOB elevated ~30 deg, no lying on L side tonight.  Barriers to return to prior living situation: vertigo, impaired gait stability  Recommendations for discharge: anticipate home, but recommend she stays tonight and I will f/u tomorrow morning  Rationale for recommendations: anticipate improvement in vertigo with treatment, may need another treatment tomorrow morning to resolve symptoms as possible multi-canal involvement       Entered by: Silvano Mcqueen 08/02/2017 3:56 PM

## 2017-08-02 NOTE — ED PROVIDER NOTES
History     Chief Complaint:  Dizziness and Nausea & Vomiting    HPI   Terri Leavitt is a 77 year old female with DM, hypothyroidism, and history of vertigo, who presents with spinning sensation. She endorses constant vertigo since she woke up yesterday morning, unlike during prior episodes, when it was only provoked by moving her head. At the ED she denies current dizziness, but states she cannot stand up without falling. She endorses nausea, but denies any vomiting,  headache, numbness or weakness.     Allergies:  Sulfas     Medications:    Glipizide   Metformin  Simvastatin  Oxybutynin  Levothyroxine     Past Medical History:    DM  Hypothyroidism  Vertigo     Past Surgical History:    Dental surgery     Family History:    History is non-contributory.     Social History:  Smoking status: former  Alcohol status: occasional   Patient presents with a friend.     Review of Systems   Gastrointestinal: Positive for nausea. Negative for vomiting.   Neurological: Positive for dizziness. Negative for weakness, numbness and headaches.   All other systems reviewed and are negative.      Physical Exam     Patient Vitals for the past 24 hours:   BP Temp Heart Rate Resp SpO2   08/02/17 0615 154/66 - - - -   08/02/17 0600 163/72 - - - -   08/02/17 0545 161/69 - - - -   08/02/17 0530 152/78 - - - -   08/02/17 0515 149/66 - - - 95 %   08/02/17 0500 157/67 - - - 95 %   08/02/17 0456 149/70 97.5  F (36.4  C) 56 16 95 %       Physical Exam  Nursing note and vitals reviewed.  Constitutional: Cooperative.   HENT:   Mouth/Throat: Mucous membranes are normal.   Eyes: Pupils are equal, round, and reactive to light. EOMI  Cardiovascular: Bradycardic rate, regular rhythm and normal heart sounds.  No murmur.  Pulmonary/Chest: Effort normal and breath sounds normal. No respiratory distress. No wheezes. No rales.   Abdominal: Soft. Normal appearancn. There is no tenderness.    Neurological: Alert and oriented x 4. CN 2-12 intact.  GCS 15.  Strength and sensation normal throughout.  Skin: Skin is warm and dry.  Psychiatric: Normal mood and affect.      Emergency Department Course     ECG (04:59:57):  Indication: dizziness.   Rate 55 bpm. WI interval 188 ms. QRS duration 86 ms. QT/QTc 456/436 ms. R axis 2.   Interpretation: sinus bradycardia.   Agree with computer interpretation.   Interpreted by Brad Flores MD.     Imaging:  Radiology findings were communicated with the patient who voiced understanding of the findings.    MRA Head without contrast, per radiology:  Pending     MRA Neck with and without contrast, per radiology:  Pending     MR brain, without and with contrast, per radiology:    Pending     Laboratory:  Laboratory findings were communicated with the patient who voiced understanding of the findings.    CBC: WBC 8.7, HGB 11.7,   BMP: glucose 387, Creatinine 0.90    0510: Troponin I: 0.017     UA:  Pending    Interventions:  0513: Meclizine 25mg, PO  0513: NS 1,000 mL, IV      Emergency Department Course:  Nursing notes and vitals reviewed.  I performed an exam of the patient as documented above.   The patient was placed on continuous pulse oximetry and cardiac monitoring.     A peripheral IV was established and blood was drawn for laboratory testing, results above.  MRI/MRA ordered, pending at time of sign out.  The patient provided a urine sample here in the emergency department. This was sent for laboratory testing, results pending.     0615, patient was rechecked and feeling improved.    Patient will be signed out to incoming ED physician at 7 AM for further management and disposition.    Impression & Plan      Medical Decision Making:  Terri Leavitt is a 77 year old female with the above history, who presents with dizziness described as vertiginous. She has had this before, but states that this is somewhat different. Given her age, I was concerned for possible acute stroke or vascular abnormality in the vertebral circulation.  MRI/MRA was ordered but pending at this time. After IVF's and Antivert she is feeling improved but notes that she has not tried to get up yet. Basic blood count shows hyperglycemia, otherwise is unremarkable. This does not sound like an infectious etiology, though we will check urine, which is still pending at this time. Plan of care will be supportive if MRI/MRA is normal, I anticipate she will be discharged home, vs admission for symptomatic care, depending on how she is doing. Certainly if imaging is abnormal we will need to address that. Patient feels comfortable with this and patient will be signed out to the incoming [facility] physician at 7 AM.    Preliminary diagnosis:    ICD-10-CM   1. Vertigo R42       Scribe Disclosure:  I, Tiffanie Verdugo, am serving as a scribe at 5:08 AM on 8/2/2017 to document services personally performed by Brad Flores MD, based on my observations and the provider's statements to me.    [facility] EMERGENCY DEPARTMENT       Brad Flores MD  08/02/17 0693

## 2017-08-02 NOTE — ED NOTES
ED VISIT ADDENDUM:    The care of this patient was signed out to me at shift change by Dr. Flores.    On recheck, the patient is still too vertiginous to ambulate. MRI imaging is unremarkable for evidence of stroke or cervical vessel disease. She will be placed in the Obs Unit for continuous support of care and possible ENT consult.

## 2017-08-02 NOTE — ED NOTES
Patient is here visiting from [place]. Comes in by EMS for eval of sudden onset of dizziness (states she can't stand up) and N/V which started at 0200. Patient states that she has experienced vertigo in the past but this feels much worse.

## 2017-08-02 NOTE — PHARMACY-ADMISSION MEDICATION HISTORY
Admission medication history interview status for this patient is complete. See Psychiatric admission navigator for allergy information, prior to admission medications and immunization status.     Medication history interview source(s):Patient  Medication history resources (including written lists, pill bottles, clinic record):None  Primary pharmacy:[facility]    Changes made to PTA medication list:  Added: HCTZ, Lantus,Losartan, Metoprolol and all doses and freq      Actions taken by pharmacist (provider contacted, etc):None     Additional medication history information:None    Medication reconciliation/reorder completed by provider prior to medication history? No    Do you take OTC medications (eg tylenol, ibuprofen, fish oil, eye/ear drops, etc)? See list    For patients on insulin therapy: Yes  Lantus 40 units qam  Sliding scale Novolog No   Patients eat three meals a day:   No eats meal at noon and at dinner, then a snack at bedtime to prevent hypoglycemia   Any Barriers to therapy:  cost of medications/comfortable with giving injections (if applicable)/ comfortable and confident with current diabetes regimen: No has HMO      Prior to Admission medications    Medication Sig Last Dose Taking? Auth Provider   GLIPIZIDE PO Take 20 mg by mouth every morning (before breakfast)  8/1/2017 at Unknown time Yes Reported, Patient   METFORMIN HCL PO Take 1,000 mg by mouth 2 times daily (with meals)  8/1/2017 at Unknown time Yes Reported, Patient   SIMVASTATIN PO Take 20 mg by mouth At Bedtime  8/1/2017 at Unknown time Yes Reported, Patient   ASPIRIN PO Take 81 mg by mouth daily  8/1/2017 at Unknown time Yes Reported, Patient   FOLIC ACID PO Take 1 mg by mouth daily 8/1/2017 at Unknown time Yes Reported, Patient   OXYBUTYNIN CHLORIDE PO Take 10 mg by mouth At Bedtime  8/1/2017 at Unknown time Yes Reported, Patient   Levothyroxine Sodium (SYNTHROID PO) Take 112 mcg by mouth daily  8/2/2017 at Unknown time Yes  Reported, Patient   meclizine (ANTIVERT) 12.5 MG tablet Take 1-2 tablets (12.5-25 mg) by mouth 4 times daily as needed for dizziness  Yes Brad Flores MD   GLIPIZIDE PO Take 10 mg by mouth every evening 8/1/2017 at Unknown time Yes Unknown, Entered By History   HYDROCHLOROTHIAZIDE PO Take 12.5 mg by mouth daily 8/1/2017 at Unknown time Yes Unknown, Entered By History   insulin glargine (LANTUS) 100 UNIT/ML injection Inject 40 Units Subcutaneous every morning 8/1/2017 at Unknown time Yes Unknown, Entered By History   LOSARTAN POTASSIUM PO Take 100 mg by mouth every evening 8/1/2017 at Unknown time Yes Unknown, Entered By History   METOPROLOL TARTRATE PO Take 50 mg by mouth 2 times daily 8/1/2017 at Unknown time Yes Unknown, Entered By History

## 2017-08-03 ENCOUNTER — APPOINTMENT (OUTPATIENT)
Dept: PHYSICAL THERAPY | Facility: CLINIC | Age: 77
End: 2017-08-03
Attending: PHYSICIAN ASSISTANT
Payer: MEDICARE

## 2017-08-03 VITALS
SYSTOLIC BLOOD PRESSURE: 147 MMHG | WEIGHT: 159.7 LBS | RESPIRATION RATE: 16 BRPM | DIASTOLIC BLOOD PRESSURE: 51 MMHG | OXYGEN SATURATION: 98 % | TEMPERATURE: 96.5 F | BODY MASS INDEX: 26.61 KG/M2 | HEART RATE: 61 BPM | HEIGHT: 65 IN

## 2017-08-03 LAB — GLUCOSE BLDC GLUCOMTR-MCNC: 219 MG/DL (ref 70–99)

## 2017-08-03 PROCEDURE — 96372 THER/PROPH/DIAG INJ SC/IM: CPT

## 2017-08-03 PROCEDURE — 95992 CANALITH REPOSITIONING PROC: CPT | Mod: GP | Performed by: PHYSICAL THERAPIST

## 2017-08-03 PROCEDURE — 99217 ZZC OBSERVATION CARE DISCHARGE: CPT | Performed by: PHYSICIAN ASSISTANT

## 2017-08-03 PROCEDURE — 96361 HYDRATE IV INFUSION ADD-ON: CPT

## 2017-08-03 PROCEDURE — G0378 HOSPITAL OBSERVATION PER HR: HCPCS

## 2017-08-03 PROCEDURE — 25000131 ZZH RX MED GY IP 250 OP 636 PS 637: Performed by: PHYSICIAN ASSISTANT

## 2017-08-03 PROCEDURE — 00000146 ZZHCL STATISTIC GLUCOSE BY METER IP

## 2017-08-03 PROCEDURE — 25000132 ZZH RX MED GY IP 250 OP 250 PS 637: Performed by: PHYSICIAN ASSISTANT

## 2017-08-03 PROCEDURE — 40000193 ZZH STATISTIC PT WARD VISIT: Performed by: PHYSICAL THERAPIST

## 2017-08-03 PROCEDURE — 97112 NEUROMUSCULAR REEDUCATION: CPT | Mod: GP | Performed by: PHYSICAL THERAPIST

## 2017-08-03 RX ORDER — MECLIZINE HYDROCHLORIDE 25 MG/1
25 TABLET ORAL 3 TIMES DAILY PRN
Qty: 30 TABLET | Refills: 0 | Status: SHIPPED | OUTPATIENT
Start: 2017-08-03

## 2017-08-03 RX ADMIN — ASPIRIN 81 MG 81 MG: 81 TABLET ORAL at 08:11

## 2017-08-03 RX ADMIN — METOPROLOL TARTRATE 50 MG: 50 TABLET, FILM COATED ORAL at 08:11

## 2017-08-03 RX ADMIN — INSULIN GLARGINE 40 UNITS: 100 INJECTION, SOLUTION SUBCUTANEOUS at 08:10

## 2017-08-03 RX ADMIN — MECLIZINE HYDROCHLORIDE 25 MG: 25 TABLET ORAL at 05:36

## 2017-08-03 RX ADMIN — LEVOTHYROXINE SODIUM 112 MCG: 112 TABLET ORAL at 08:11

## 2017-08-03 RX ADMIN — METFORMIN HYDROCHLORIDE 1000 MG: 500 TABLET ORAL at 08:10

## 2017-08-03 RX ADMIN — HYDROCHLOROTHIAZIDE 12.5 MG: 12.5 CAPSULE ORAL at 08:11

## 2017-08-03 RX ADMIN — GLIPIZIDE 20 MG: 10 TABLET ORAL at 08:11

## 2017-08-03 RX ADMIN — MECLIZINE HYDROCHLORIDE 25 MG: 25 TABLET ORAL at 11:37

## 2017-08-03 NOTE — PLAN OF CARE
Problem: Discharge Planning  Goal: Discharge Planning (Adult, OB, Behavioral, Peds)  PRIMARY DIAGNOSIS: VERTIGO     OUTPATIENT/OBSERVATION GOALS TO BE MET BEFORE DISCHARGE  1. Orthostatic performed: Yes:          Lying Orthostatic BP: 167/62         Sitting Orthostatic BP: 178/62         Standing Orthostatic BP: 151/70      2. Completion of appropriate imaging: Yes     3. Tolerating PO medications: Yes     4. Return to near baseline physical activity: Yes     5. Cleared for discharge by consultants (if involved): No     Pt reports feeling much better after working with PT. PT will see pt again tomorrow.   Discharge Planner Nurse   Safe discharge environment identified: Yes  Barriers to discharge: No       Entered by: Ronel Eli 08/02/2017 8:38 PM     Please review provider order for any additional goals.   Nurse to notify provider when observation goals have been met and patient is ready for discharge.

## 2017-08-03 NOTE — PLAN OF CARE
"Problem: Discharge Planning  Goal: Discharge Planning (Adult, OB, Behavioral, Peds)  Outcome: Improving  PRIMARY DIAGNOSIS: VERTIGO      OUTPATIENT/OBSERVATION GOALS TO BE MET BEFORE DISCHARGE  1. Orthostatic performed: Yes on day shift        Lying Orthostatic BP: 167/62         Sitting Orthostatic BP: 178/62         Standing Orthostatic BP: 151/70       2. Completion of appropriate imaging: Yes      3. Tolerating PO medications: Yes      4. Return to near baseline physical activity: Yes      5. Cleared for discharge by consultants (if involved): No      Pt denies dizziness currently. States she is feeling \"back to normal\". Meclizine scheduled q 6; given at 0000. NS running at 75. PT saw, will be back.  Moving w/ SBA.      Discharge Planner Nurse   Safe discharge environment identified: Yes - Pt lives in [place]   Barriers to discharge: No            Please review provider order for any additional goals.   Nurse to notify provider when observation goals have been met and patient is ready for discharge.      "

## 2017-08-03 NOTE — DISCHARGE SUMMARY
[facility] Outpatient / Observation Unit  Discharge Summary        Terri Leavitt MRN# [id number]   YOB: 1940 Age: 77 year old     Date of Admission:  8/2/2017  Date of Discharge:  8/3/2017  Admitting Physician:  Fredis Kenny MD  Discharge Physician: Yee Desir PA-C  Discharging Service: Hospitalist      Primary Provider: None  Primary Care Physician Phone Number: None         Primary Discharge Diagnoses:    Terri Leavitt was admitted on 8/2/2017 with complaints of vertigo.     1. Acute vertigo: suspect BPPV. Symptoms now improved.          Secondary Discharge Diagnoses:     Past Medical History:   Diagnosis Date     Diabetes (H)      Hypothyroidism             Code Status:      Full Code        Brief Hospital Summary:       Reason for your hospital stay      You were admitted due to concerns for dizziness related to benign paroxysmal positional vertigo. You were treated supportively overnight and evaluated by physical therapy who performed canalith repositioning. Your symptoms improved and you were safe to discharge home with outpatient follow up.       Please refer to initial admission history and physical for further details.   Briefly, Terri Leavitt was admitted on 8/2/2017 for complaints of dizziness and spinning sensation consistent with Vertigo. Initial work up in the ED unremarkable except for hyperglycemia. Imaging was performed and showed: negative MRI/MRA of brain. EKG did not reveal evidence of significant cardiac arrhythmias or ischemia.  Pt was registered to the Observation Unit for further evaluation.     Pt was initiated on oral meclizine and IVF. Other supportive measures including anti-emetics, benzodiazepines were offered. PT was consulted for vestibular rehab. On the day of discharge, patient symptoms improved, vitals remained stable and pt was deemed safe for discharge. Medications were reviewed and adjustments made as necessary. Pt is instructed to follow up as below.            Significant Lab During Hospitalization:        Recent Labs  Lab 08/02/17  0510   WBC 8.7   HGB 11.7   HCT 35.6   MCV 91          Recent Labs  Lab 08/02/17  0510      POTASSIUM 4.2   CHLORIDE 104   CO2 26   ANIONGAP 8   *   BUN 19   CR 0.90   GFRESTIMATED 61   GFRESTBLACK 73   NORMAN 8.6            Significant Imaging During Hospitalization:      Results for orders placed or performed during the hospital encounter of 08/02/17   MR Brain w/o & w Contrast    Narrative    MRI OF THE BRAIN WITHOUT AND WITH CONTRAST August 2, 2017 7:38 AM     HISTORY: Vertigo.     TECHNIQUE: Axial diffusion-weighted with ADC map, axial T2-weighted  with fat saturation, axial T1-weighted, axial turboFLAIR and coronal  T1-weighted images of the brain were acquired without intravenous  contrast. Following intravenous administration of gadolinium (10 mL  Gadavist), axial T1-weighted images of the brain were acquired.     COMPARISON: None.    FINDINGS: There is mild diffuse cerebral volume loss. There are a few  tiny scattered focal and minimal patchy periventricular areas of  abnormal T2 signal hyperintensity in the cerebral white matter  bilaterally that are consistent with sequela of chronic small vessel  ischemic disease.     The ventricles and basal cisterns are within normal limits in  configuration given the degree of cerebral volume loss. There is no  midline shift. There are no extra-axial fluid collections. There is no  evidence for stroke or acute intracranial hemorrhage. There is no  abnormal contrast enhancement in the brain or its coverings.     There is no sinusitis or mastoiditis.      Impression    IMPRESSION: Diffuse cerebral volume loss and cerebral white matter  changes consistent with chronic small vessel ischemic disease. No  evidence for acute intracranial pathology.    NY LEAL MD   MR Head w/o Contrast Angiogram    Narrative    MR ANGIOGRAM OF THE HEAD WITHOUT CONTRAST August 2, 2017 7:39 AM      HISTORY: Vertigo.    TECHNIQUE: 3D time-of-flight MR angiogram of the head without  contrast. MIP reconstruction of all MR angiographic data was  performed.    COMPARISON: None.    FINDINGS: The bilateral distal internal carotid, basilar, bilateral  anterior cerebral, bilateral middle cerebral and bilateral posterior  cerebral arteries are patent and unremarkable with no evidence for  cerebral artery stenosis or aneurysm.       Impression    IMPRESSION: Normal MR angiogram of the head.      NY LEAL MD   MR Neck w/o & w Contrast Angiogram    Narrative    MRA NECK WITHOUT AND WITH CONTRAST August 2, 2017 7:39 AM     HISTORY: Evaluate for dissection, vertebrobasilar flow, carotid  stenosis.    TECHNIQUE: 2D time-of-flight MR angiogram of the neck without contrast  and 3D MR angiogram of the neck with 10 mL Gadavist gadolinium IV  contrast. MIP reconstruction of all MR angiographic data was  performed. Estimates of carotid stenoses are made relative to the  distal internal carotid artery diameters except as noted.    COMPARISON: None.    FINDINGS:    Carotids: The common carotid arteries bilaterally are widely patent.  The cervical internal carotid arteries bilaterally are patent without  stenosis.    Vertebrals: The vertebral arteries bilaterally are patent without  stenosis and demonstrate antegrade flow.    Aortic arch: The arteries as they arise from the aortic arch are  normally arranged with no evidence for stenosis.      Impression    IMPRESSION: Normal MR angiogram of the neck.      NY LEAL MD            Pending Results:      None        Consultations This Hospital Stay:      No consultations were requested during this admission         Discharge Instructions and Follow-Up:      Follow-up Appointments    Follow up with primary care provider, within 7 days for hospital follow- up. No follow up labs or test are needed.   Discuss with primary care about outpatient physical therapy for vestibular  "rehab as needed.            Discharge Disposition:      Discharged to home         Discharge Medications:        Current Discharge Medication List      START taking these medications    Details   order for DME Equipment being ordered: Cane ()  Treatment Diagnosis: Impaired gait stability  Qty: 1 each, Refills: 0    Associated Diagnoses: BPPV (benign paroxysmal positional vertigo), left      meclizine (ANTIVERT) 25 MG tablet Take 1 tablet (25 mg) by mouth 3 times daily as needed for dizziness  Qty: 30 tablet, Refills: 0    Associated Diagnoses: BPPV (benign paroxysmal positional vertigo), left         CONTINUE these medications which have NOT CHANGED    Details   !! GLIPIZIDE PO Take 20 mg by mouth every morning (before breakfast)       METFORMIN HCL PO Take 1,000 mg by mouth 2 times daily (with meals)       SIMVASTATIN PO Take 20 mg by mouth At Bedtime       ASPIRIN PO Take 81 mg by mouth daily       FOLIC ACID PO Take 1 mg by mouth daily      OXYBUTYNIN CHLORIDE PO Take 10 mg by mouth At Bedtime       Levothyroxine Sodium (SYNTHROID PO) Take 112 mcg by mouth daily       !! GLIPIZIDE PO Take 10 mg by mouth every evening      HYDROCHLOROTHIAZIDE PO Take 12.5 mg by mouth daily      insulin glargine (LANTUS) 100 UNIT/ML injection Inject 40 Units Subcutaneous every morning      LOSARTAN POTASSIUM PO Take 100 mg by mouth every evening      METOPROLOL TARTRATE PO Take 50 mg by mouth 2 times daily       !! - Potential duplicate medications found. Please discuss with provider.            Allergies:         Allergies   Allergen Reactions     Altace [Ramipril] Other (See Comments)     cough     Sulfa Drugs Rash           Condition and Physical on Discharge:      Discharge condition: Stable   Vitals: Blood pressure 144/65, pulse 68, temperature 96.4  F (35.8  C), temperature source Oral, resp. rate 16, height 1.651 m (5' 5\"), weight 72.4 kg (159 lb 11.2 oz), SpO2 98 %.  159 lbs 11.2 oz      GENERAL:  Comfortable.  PSYCH: " pleasant, oriented, No acute distress.  HEENT:  PERRLA. Normal conjunctiva, normal hearing, nasal mucosa and oropharynx are normal.  NECK:  Supple, no neck vein distention, adenopathy or bruits, normal thyroid.  HEART:  Normal S1, S2 with no murmur, no pericardial rub, gallops or S3 or S4.  LUNGS:  Clear to auscultation, normal respiratory effort. No wheezing, rales or ronchi.  ABDOMEN:  Soft, no hepatosplenomegaly, normal bowel sounds. Non-tender, non distended.   EXTREMITIES:  No pedal edema, +2 pulses bilateral and equal.  SKIN:  Dry to touch, No rash, wound or ulcerations.  NEUROLOGIC:  CN 2-12 intact, BL 5/5 symmetric upper and lower extremity strength, sensation is intact with no focal deficits.     Yee Desir PA-C

## 2017-08-03 NOTE — PLAN OF CARE
"Problem: Discharge Planning  Goal: Discharge Planning (Adult, OB, Behavioral, Peds)  Outcome: Improving  PRIMARY DIAGNOSIS: VERTIGO      OUTPATIENT/OBSERVATION GOALS TO BE MET BEFORE DISCHARGE  1. Orthostatic performed: Yes on day shift        Lying Orthostatic BP: 167/62         Sitting Orthostatic BP: 178/62         Standing Orthostatic BP: 151/70       2. Completion of appropriate imaging: Yes      3. Tolerating PO medications: Yes      4. Return to near baseline physical activity: Yes      5. Cleared for discharge by consultants (if involved): No       Pt denies dizziness currently. States she is feeling \"back to normal\". Meclizine scheduled q 6; will give next dose at 0600. NS running at 75. PT saw, will be back.  Moving w/ SBA.       Discharge Planner Nurse   Safe discharge environment identified: Yes - Pt lives in [place]   Barriers to discharge: No          Please review provider order for any additional goals.   Nurse to notify provider when observation goals have been met and patient is ready for discharge.      "

## 2017-08-03 NOTE — PLAN OF CARE
Problem: Discharge Planning  Goal: Discharge Planning (Adult, OB, Behavioral, Peds)  Outcome: Improving  PRIMARY DIAGNOSIS: VERTIGO     OUTPATIENT/OBSERVATION GOALS TO BE MET BEFORE DISCHARGE  1. Orthostatic performed: Yes:          Lying Orthostatic BP: 167/62         Sitting Orthostatic BP: 178/62         Standing Orthostatic BP: 151/70      2. Completion of appropriate imaging: Yes     3. Tolerating PO medications: Yes     4. Return to near baseline physical activity: Yes     5. Cleared for discharge by consultants (if involved): No, PT to see patient to perform      Discharge Planner Nurse   Safe discharge environment identified: Yes  Barriers to discharge: No       Entered by: Maryjane Garvey 08/03/2017 08:00AM     Please review provider order for any additional goals.   Nurse to notify provider when observation goals have been met and patient is ready for discharge.    Patient up with stand by assist for safety. Patient states she has some dizziness, but feels a lot better. PT will see patient to adjust the right ear and will wait for recommendations.

## 2017-08-03 NOTE — PLAN OF CARE
Problem: Discharge Planning  Goal: Discharge Planning (Adult, OB, Behavioral, Peds)  Patient's After Visit Summary was reviewed with patient.   Patient verbalized understanding of After Visit Summary, recommended follow up and was given an opportunity to ask questions.   Discharge medications sent home with patient/family: YES   Discharged with daughter and other: friend       OBSERVATION patient END time: 0553

## 2017-08-03 NOTE — PLAN OF CARE
Problem: Goal Outcome Summary  Goal: Goal Outcome Summary     Discharge Planner PT   Patient plan for discharge: home  Current status: Improving symptoms from yesterday, but continued mild vertigo with positional testing.  (+) L hallpike test yet indicating L BPPV, possible R side involvement too. Treated with CRM x 4 today again.  Able to amb in hallway x 150ft with or without FWW, recommend use of cane, will issue prior to d/c, pt in agreement.  Instructed pt in self positional treatment as needed when she leaves.  Barriers to return to prior living situation: vertigo  Recommendations for discharge: home (to sister's home as she is visiting from [place]), f/u with PCP and OP Vestibular Rehab back home  Rationale for recommendations: will benefit from cane and f/u with OP vestib PT once home to further resolve symptoms.       Entered by: Silvano Mcqueen 08/03/2017 10:39 AM         Physical Therapy Discharge Summary    Reason for therapy discharge:    Discharged to home with outpatient therapy.    Progress towards therapy goal(s). See goals on Care Plan in [facility] electronic health record for goal details.  Goals partially met.  Barriers to achieving goals:   discharge from facility.  Cane issued for home due to balance concerns with vertigo.    Therapy recommendation(s):    Continued therapy is recommended.  Rationale/Recommendations:  f/u with PCP and OP Vestibular PT once returns home to [place].  Will likely need further canalith repositioning, instructed in self maneuvers..

## 2017-08-03 NOTE — PLAN OF CARE
Problem: Discharge Planning  Goal: Discharge Planning (Adult, OB, Behavioral, Peds)  Outcome: Improving  Problem: Discharge Planning  Goal: Discharge Planning (Adult, OB, Behavioral, Peds)  Outcome: Improving  PRIMARY DIAGNOSIS: VERTIGO      OUTPATIENT/OBSERVATION GOALS TO BE MET BEFORE DISCHARGE  1. Orthostatic performed: Yes:          Lying Orthostatic BP: 167/62         Sitting Orthostatic BP: 178/62         Standing Orthostatic BP: 151/70       2. Completion of appropriate imaging: Yes      3. Tolerating PO medications: Yes      4. Return to near baseline physical activity: Yes      5. Cleared for discharge by consultants (if involved): Yes      Discharge Planner Nurse   Safe discharge environment identified: Yes  Barriers to discharge: No       Entered by: Maryjane Garvey 08/03/2017 11:30AM     Please review provider order for any additional goals.   Nurse to notify provider when observation goals have been met and patient is ready for discharge.     PT saw patient and adjusted right ear, and recommended a cane. Patient states she feels a lot better with minimal dizziness. Patient will continue scheduled meclizine. Will await discharge orders.